# Patient Record
(demographics unavailable — no encounter records)

---

## 2020-03-22 NOTE — RAD
EXAM: 3 views of the left hand



COMPARISON: None



HISTORY: Hand pain



FINDINGS: 3 views of the hand shows no evidence of acute fracture or dislocation. No degenerative manfred
nges are seen. No soft tissue swelling is present.



IMPRESSION: Unremarkable exam.



Reported By: Chi Deal 

Electronically Signed:  3/22/2020 9:07 PM